# Patient Record
(demographics unavailable — no encounter records)

---

## 2024-11-19 NOTE — DISCUSSION/SUMMARY
[FreeTextEntry1] : URI - Symptomatic therapy as needed including acetaminophen or ibuprofen for fever. Increase fluids Avoid airway irritants Discussed use/avoidance of cold symptom medications Call if no better 3-5 days, sooner for change/concerns/wheeze/distress recheck prn

## 2024-11-19 NOTE — HISTORY OF PRESENT ILLNESS
[de-identified] : Coughing for 4 days, temp on weekend [FreeTextEntry6] : c/o cough , congestion x few days

## 2024-11-19 NOTE — HISTORY OF PRESENT ILLNESS
[de-identified] : Coughing for 4 days, temp on weekend [FreeTextEntry6] : c/o cough , congestion x few days

## 2024-12-18 NOTE — DISCUSSION/SUMMARY
[FreeTextEntry1] : 2 year old w/ right AOM in setting of uri. well appearing w/ normal vitals. of note, crackles appreciated right anterior lung so will send for cxr to confirm if RLL pneumonia vs viral illness -f/u cxr and rvp (r/o mycoplasma) -Complete antibiotic course. Potential side effect of antibiotics includes but not limited to diarrhea. Provide ibuprofen as needed for pain or fever. If no improvement within 48 hours return for re-evaluation. - Recommended supportive care, including antipyretics, fluids, nasal suction, use of humidifiers, and elevating head w/ extra pillow for postnasal drip.  - If new or worsening symptoms or parental concern - return to office or ED.

## 2024-12-18 NOTE — PHYSICAL EXAM
[Clear] : left tympanic membrane clear [Erythema] : erythema [Purulent Effusion] : purulent effusion [NL] : warm, clear [FreeTextEntry7] : right sided crackles anteriorly

## 2025-02-06 NOTE — HISTORY OF PRESENT ILLNESS
[de-identified] : HAD FEVER TODAY THIS MORNING, RUNNY NOSE  [FreeTextEntry6] : high fever, mother due with new baby in few weeks

## 2025-03-05 NOTE — CARE PLAN
[Care Plan reviewed and provided to patient/caregiver] : Care plan reviewed and provided to patient/caregiver [Care Plan reviewed every ___ weeks] : Care plan reviewed every [unfilled] weeks [Understands and communicates without difficulty] : Patient/Caregiver understands and communicates without difficulty [FreeTextEntry2] : have regular BMs  [FreeTextEntry3] : Discussed constipation at length, its causes and treatments. Discussed increasing fruits and fiber in diet as well as adequate fluid intake. Also discussed responding to body cues of need to defecate. Medication trial of: miralax qd  Call if no better 1 week recheck in office PRN

## 2025-03-05 NOTE — DISCUSSION/SUMMARY
[FreeTextEntry1] : Discussed constipation at length, its causes and treatments. Discussed increasing fruits and fiber in diet as well as adequate fluid intake. Also discussed responding to body cues of need to defecate. Medication trial of: miralax qd  Call if no better 1 week recheck in office PRN   Baby bagged for urine but paretns took bag off They will put a bag at home tonight and collect sample ( mom is MD)  Specimen to be  sent to lab for complete UA and culture w/sensitivities ( labels given)  Discussed pathophysiology of UTI with patient/family Increase fluids, encourage complete bladder emptying and avoidance of UTI triggers (constipation, urethral irritation) Call  for vomiting, lethargy, dehydration, concerns. Recheck in office prn

## 2025-03-05 NOTE — HISTORY OF PRESENT ILLNESS
[de-identified] : COMPLAINING OF PAIN WHEN SHE URINATES OFF AND ON FOR A FEW WEEKS [FreeTextEntry6] : c/o dysuria on and off x few weeks potty training, constipation  normal appetite

## 2025-03-10 NOTE — HISTORY OF PRESENT ILLNESS
[Mother] : mother [Normal] : Normal [Yes] : Patient goes to dentist yearly [Toothpaste] : Primary Fluoride Source: Toothpaste [In nursery school] : In nursery school [Appropiate parent-child communication] : Appropriate parent-child communication [Child given choices] : Child given choices [No] : No cigarette smoke exposure [Water heater temperature set at <120 degrees F] : Water heater temperature set at <120 degrees F [Car seat in back seat] : Car seat in back seat [Smoke Detectors] : Smoke detectors [Supervised play near cars and streets] : Supervised play near cars and streets [Carbon Monoxide Detectors] : Carbon monoxide detectors [FreeTextEntry7] : constipation improving, dysuria resolved  [LastFluorideTreatment] : 2/2025  [FreeTextEntry1] : 3 YR OLD WELL VISIT

## 2025-03-10 NOTE — DISCUSSION/SUMMARY
[Normal Growth] : growth [Normal Development] : development [None] : No known medical problems [No Elimination Concerns] : elimination [No Feeding Concerns] : feeding [No Skin Concerns] : skin [Normal Sleep Pattern] : sleep [Family Support] : family support [Encouraging Literacy Activities] : encouraging literacy activities [Playing with Peers] : playing with peers [Promoting Physical Activity] : promoting physical activity [Safety] : safety [No Medications] : ~He/She~ is not on any medications [Parent/Guardian] : parent/guardian [] : The components of the vaccine(s) to be administered today are listed in the plan of care. The disease(s) for which the vaccine(s) are intended to prevent and the risks have been discussed with the caretaker.  The risks are also included in the appropriate vaccination information statements which have been provided to the patient's caregiver.  The caregiver has given consent to vaccinate. [FreeTextEntry1] : Continue balanced diet with all food groups. Brush teeth twice a day with toothbrush. Recommend visit to dentist. As per car seat 's guidelines, use forward-facing car seat in back seat of car. Switch to booster seat when child reaches highest weight/height for seat. Child needs to ride in a belt-positioning booster seat until  4 feet 9 inches has been reached and are between 8 and 12 years of age. Put toddler to sleep in own bed. Help toddler to maintain consistent daily routines and sleep schedule. Pre-K discussed. Ensure home is safe. Use consistent, positive discipline. Read aloud to toddler. Limit screen time to no more than 2 hours per day. will return with dad for flu shot   Allergic Rhinitis- Symptomatic therapy. Cool mist vaporizer. Practical allergen avoidance discussed.  Shower after playing outdoors. Drink plenty of water.  Keep bedroom windows closed.  Trial: zyrtec qd     . Call if no better 1 week. Discussed reasons to seek immediate care. Recheck in office prn  Eczema- Discussed causes, management options. Recommend daily moisturizer and topical steroid as needed. Avoid synthetic clothing. Use only hypoallergenic products. For example,  Cetaphil cream (not baby Cetaphil),  CeraVe cream, Vanicream cream, Aquaphor ointment, Vaseline ointment.  Bathe every 2-3 days, avoiding hot water.  Sleep with cool mist humidifier.

## 2025-03-10 NOTE — DEVELOPMENTAL MILESTONES

## 2025-03-10 NOTE — PHYSICAL EXAM
